# Patient Record
Sex: MALE | Race: ASIAN | NOT HISPANIC OR LATINO | URBAN - METROPOLITAN AREA
[De-identification: names, ages, dates, MRNs, and addresses within clinical notes are randomized per-mention and may not be internally consistent; named-entity substitution may affect disease eponyms.]

---

## 2018-03-07 ENCOUNTER — INPATIENT (INPATIENT)
Facility: HOSPITAL | Age: 33
LOS: 28 days | Discharge: HOME | End: 2018-04-05
Attending: PSYCHIATRY & NEUROLOGY | Admitting: PSYCHIATRY & NEUROLOGY
Payer: MEDICAID

## 2018-03-07 VITALS
TEMPERATURE: 98 F | SYSTOLIC BLOOD PRESSURE: 137 MMHG | OXYGEN SATURATION: 98 % | HEART RATE: 84 BPM | RESPIRATION RATE: 17 BRPM | DIASTOLIC BLOOD PRESSURE: 89 MMHG

## 2018-03-07 DIAGNOSIS — F29 UNSPECIFIED PSYCHOSIS NOT DUE TO A SUBSTANCE OR KNOWN PHYSIOLOGICAL CONDITION: ICD-10-CM

## 2018-03-07 DIAGNOSIS — F20.0 PARANOID SCHIZOPHRENIA: ICD-10-CM

## 2018-03-07 LAB
ALBUMIN SERPL ELPH-MCNC: 4.3 G/DL — SIGNIFICANT CHANGE UP (ref 3.3–5)
ALP SERPL-CCNC: 59 U/L — SIGNIFICANT CHANGE UP (ref 40–120)
ALT FLD-CCNC: 16 U/L — SIGNIFICANT CHANGE UP (ref 4–41)
APAP SERPL-MCNC: < 15 UG/ML — LOW (ref 15–25)
AST SERPL-CCNC: 21 U/L — SIGNIFICANT CHANGE UP (ref 4–40)
BARBITURATES MEASUREMENT: NEGATIVE — SIGNIFICANT CHANGE UP
BASOPHILS # BLD AUTO: 0.01 K/UL — SIGNIFICANT CHANGE UP (ref 0–0.2)
BASOPHILS NFR BLD AUTO: 0.2 % — SIGNIFICANT CHANGE UP (ref 0–2)
BENZODIAZ SERPL-MCNC: NEGATIVE — SIGNIFICANT CHANGE UP
BILIRUB SERPL-MCNC: 0.4 MG/DL — SIGNIFICANT CHANGE UP (ref 0.2–1.2)
BUN SERPL-MCNC: 23 MG/DL — SIGNIFICANT CHANGE UP (ref 7–23)
CALCIUM SERPL-MCNC: 9 MG/DL — SIGNIFICANT CHANGE UP (ref 8.4–10.5)
CHLORIDE SERPL-SCNC: 106 MMOL/L — SIGNIFICANT CHANGE UP (ref 98–107)
CO2 SERPL-SCNC: 25 MMOL/L — SIGNIFICANT CHANGE UP (ref 22–31)
CREAT SERPL-MCNC: 0.79 MG/DL — SIGNIFICANT CHANGE UP (ref 0.5–1.3)
EOSINOPHIL # BLD AUTO: 0.02 K/UL — SIGNIFICANT CHANGE UP (ref 0–0.5)
EOSINOPHIL NFR BLD AUTO: 0.3 % — SIGNIFICANT CHANGE UP (ref 0–6)
ETHANOL BLD-MCNC: < 10 MG/DL — SIGNIFICANT CHANGE UP
GLUCOSE SERPL-MCNC: 106 MG/DL — HIGH (ref 70–99)
HCT VFR BLD CALC: 36.9 % — LOW (ref 39–50)
HGB BLD-MCNC: 11.9 G/DL — LOW (ref 13–17)
IMM GRANULOCYTES # BLD AUTO: 0.02 # — SIGNIFICANT CHANGE UP
IMM GRANULOCYTES NFR BLD AUTO: 0.3 % — SIGNIFICANT CHANGE UP (ref 0–1.5)
LYMPHOCYTES # BLD AUTO: 0.78 K/UL — LOW (ref 1–3.3)
LYMPHOCYTES # BLD AUTO: 12.1 % — LOW (ref 13–44)
MCHC RBC-ENTMCNC: 29.8 PG — SIGNIFICANT CHANGE UP (ref 27–34)
MCHC RBC-ENTMCNC: 32.2 % — SIGNIFICANT CHANGE UP (ref 32–36)
MCV RBC AUTO: 92.5 FL — SIGNIFICANT CHANGE UP (ref 80–100)
MONOCYTES # BLD AUTO: 0.52 K/UL — SIGNIFICANT CHANGE UP (ref 0–0.9)
MONOCYTES NFR BLD AUTO: 8.1 % — SIGNIFICANT CHANGE UP (ref 2–14)
NEUTROPHILS # BLD AUTO: 5.07 K/UL — SIGNIFICANT CHANGE UP (ref 1.8–7.4)
NEUTROPHILS NFR BLD AUTO: 79 % — HIGH (ref 43–77)
NRBC # FLD: 0 — SIGNIFICANT CHANGE UP
PLATELET # BLD AUTO: 202 K/UL — SIGNIFICANT CHANGE UP (ref 150–400)
PMV BLD: 10.6 FL — SIGNIFICANT CHANGE UP (ref 7–13)
POTASSIUM SERPL-MCNC: 4.1 MMOL/L — SIGNIFICANT CHANGE UP (ref 3.5–5.3)
POTASSIUM SERPL-SCNC: 4.1 MMOL/L — SIGNIFICANT CHANGE UP (ref 3.5–5.3)
PROT SERPL-MCNC: 6.3 G/DL — SIGNIFICANT CHANGE UP (ref 6–8.3)
RBC # BLD: 3.99 M/UL — LOW (ref 4.2–5.8)
RBC # FLD: 12.4 % — SIGNIFICANT CHANGE UP (ref 10.3–14.5)
SALICYLATES SERPL-MCNC: < 5 MG/DL — LOW (ref 15–30)
SODIUM SERPL-SCNC: 142 MMOL/L — SIGNIFICANT CHANGE UP (ref 135–145)
TSH SERPL-MCNC: 2.67 UIU/ML — SIGNIFICANT CHANGE UP (ref 0.27–4.2)
WBC # BLD: 6.42 K/UL — SIGNIFICANT CHANGE UP (ref 3.8–10.5)
WBC # FLD AUTO: 6.42 K/UL — SIGNIFICANT CHANGE UP (ref 3.8–10.5)

## 2018-03-07 PROCEDURE — 99285 EMERGENCY DEPT VISIT HI MDM: CPT

## 2018-03-07 RX ORDER — BENZTROPINE MESYLATE 1 MG
1 TABLET ORAL ONCE
Qty: 0 | Refills: 0 | Status: COMPLETED | OUTPATIENT
Start: 2018-03-07 | End: 2018-03-07

## 2018-03-07 RX ORDER — RISPERIDONE 4 MG/1
1 TABLET ORAL
Qty: 0 | Refills: 0 | Status: DISCONTINUED | OUTPATIENT
Start: 2018-03-07 | End: 2018-03-08

## 2018-03-07 RX ORDER — HALOPERIDOL DECANOATE 100 MG/ML
5 INJECTION INTRAMUSCULAR ONCE
Qty: 0 | Refills: 0 | Status: COMPLETED | OUTPATIENT
Start: 2018-03-07 | End: 2018-03-07

## 2018-03-07 RX ORDER — HALOPERIDOL DECANOATE 100 MG/ML
5 INJECTION INTRAMUSCULAR EVERY 6 HOURS
Qty: 0 | Refills: 0 | Status: DISCONTINUED | OUTPATIENT
Start: 2018-03-07 | End: 2018-04-05

## 2018-03-07 RX ORDER — BENZTROPINE MESYLATE 1 MG
1 TABLET ORAL
Qty: 0 | Refills: 0 | Status: DISCONTINUED | OUTPATIENT
Start: 2018-03-07 | End: 2018-03-09

## 2018-03-07 RX ORDER — DIPHENHYDRAMINE HCL 50 MG
50 CAPSULE ORAL EVERY 6 HOURS
Qty: 0 | Refills: 0 | Status: DISCONTINUED | OUTPATIENT
Start: 2018-03-07 | End: 2018-04-05

## 2018-03-07 RX ADMIN — Medication 1 MILLIGRAM(S): at 16:51

## 2018-03-07 RX ADMIN — HALOPERIDOL DECANOATE 5 MILLIGRAM(S): 100 INJECTION INTRAMUSCULAR at 02:45

## 2018-03-07 RX ADMIN — Medication 2 MILLIGRAM(S): at 02:45

## 2018-03-07 RX ADMIN — Medication 2 MILLIGRAM(S): at 22:02

## 2018-03-07 RX ADMIN — Medication 1 MILLIGRAM(S): at 21:24

## 2018-03-07 RX ADMIN — RISPERIDONE 1 MILLIGRAM(S): 4 TABLET ORAL at 21:24

## 2018-03-07 NOTE — ED BEHAVIORAL HEALTH NOTE - BEHAVIORAL HEALTH NOTE
Writer was informed patient required inpatient admission.  Writer was asked to contact [facility] for possible transfer.  Writer called Emmanuelle Pyle  and Francia Garcia  leaving a message for each requesting a callback to [employer] work [facility].

## 2018-03-07 NOTE — ED BEHAVIORAL HEALTH ASSESSMENT NOTE - DESCRIPTION
Vital Signs Last 24 Hrs  T(C): 36.7 (07 Mar 2018 09:29), Max: 36.7 (07 Mar 2018 04:27)  T(F): 98 (07 Mar 2018 09:29), Max: 98 (07 Mar 2018 04:27)  HR: 58 (07 Mar 2018 09:29) (58 - 84)  BP: 116/79 (07 Mar 2018 09:29) (116/79 - 137/89)  BP(mean): --  RR: 16 (07 Mar 2018 09:29) (16 - 17)  SpO2: 99% (07 Mar 2018 09:29) (98% - 99%) denies pt currently unemployed, resides with parents

## 2018-03-07 NOTE — ED BEHAVIORAL HEALTH ASSESSMENT NOTE - RISK ASSESSMENT
Pt has elevated risk of harm to others due to recent violence, aggression, non-compliance and CAH; though no known prior suicide attempt, pt has CAH which are unclear if they tell him to harm self.

## 2018-03-07 NOTE — ED PROVIDER NOTE - PHYSICAL EXAMINATION
uncooperative, agitated, attempting to walk out.   No clonus, rigidity, tremors, fasciculations. PERRL, EOMI, no nystagmus. Strength 5/5. Steady unassisted gait.
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
## 2018-03-07 NOTE — ED BEHAVIORAL HEALTH ASSESSMENT NOTE - OTHER PAST PSYCHIATRIC HISTORY (INCLUDE DETAILS REGARDING ONSET, COURSE OF ILLNESS, INPATIENT/OUTPATIENT TREATMENT)
Per Paulie, pt last prescribed meds in 2016, was on Remeron and low dose seroquel; pt had brief outpt treatment at [facility] in 2014, 2015. No recent hospitalizations noted, but pt admits to prior inpt stay at [facility]? or [facility]? he mentioned both
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
## 2018-03-07 NOTE — ED PROVIDER NOTE - PROGRESS NOTE DETAILS
Antionette: EMs obtained another number [phone]. initially I called and someone picked up but I don't think they understood me. Called w/ mandarin  [phone] but then no one picked up. Klepfish: Pt sleeping. Labs, ekg grossly wnl. Medically cleared for further BH fitz. Klepfish: labs/ekg wnl. Medically cleared, rediscussed w/ BH, awaiting  reassessment.

## 2018-03-07 NOTE — ED BEHAVIORAL HEALTH ASSESSMENT NOTE - DETAILS
headache reports they tell him to break things at times; hurt self/others broke items in home prior to arrival attempted to call numbers available, no sucess Dr. Thompson-  attempted to call numbers available, no success

## 2018-03-07 NOTE — ED BEHAVIORAL HEALTH NOTE - BEHAVIORAL HEALTH NOTE
Worker used [employer] interpre[employer] ([phone]) to speak with patient's parents mother Christi and father Jose G ([phone]). Worker informed patient's parents that patient was admitted to  and provided unit information. Worker also provided emotional support for parents about patient's admission.

## 2018-03-08 LAB
CHOLEST SERPL-MCNC: 201 MG/DL — HIGH (ref 120–199)
FERRITIN SERPL-MCNC: 207.8 NG/ML — SIGNIFICANT CHANGE UP (ref 30–400)
FOLATE SERPL-MCNC: 11.2 NG/ML — SIGNIFICANT CHANGE UP (ref 4.7–20)
HDLC SERPL-MCNC: 113 MG/DL — HIGH (ref 35–55)
IRON SATN MFR SERPL: 273 UG/DL — SIGNIFICANT CHANGE UP (ref 155–535)
IRON SATN MFR SERPL: 76 UG/DL — SIGNIFICANT CHANGE UP (ref 45–165)
LIPID PNL WITH DIRECT LDL SERPL: 105 MG/DL — SIGNIFICANT CHANGE UP
TRIGL SERPL-MCNC: 60 MG/DL — SIGNIFICANT CHANGE UP (ref 10–149)
UIBC SERPL-MCNC: 197 UG/DL — SIGNIFICANT CHANGE UP (ref 110–370)
VIT B12 SERPL-MCNC: 469 PG/ML — SIGNIFICANT CHANGE UP (ref 200–900)

## 2018-03-08 PROCEDURE — 99222 1ST HOSP IP/OBS MODERATE 55: CPT

## 2018-03-08 RX ORDER — RISPERIDONE 4 MG/1
1 TABLET ORAL DAILY
Qty: 0 | Refills: 0 | Status: DISCONTINUED | OUTPATIENT
Start: 2018-03-08 | End: 2018-03-22

## 2018-03-08 RX ORDER — RISPERIDONE 4 MG/1
2 TABLET ORAL AT BEDTIME
Qty: 0 | Refills: 0 | Status: DISCONTINUED | OUTPATIENT
Start: 2018-03-08 | End: 2018-03-09

## 2018-03-08 RX ADMIN — RISPERIDONE 2 MILLIGRAM(S): 4 TABLET ORAL at 21:13

## 2018-03-08 RX ADMIN — RISPERIDONE 1 MILLIGRAM(S): 4 TABLET ORAL at 08:54

## 2018-03-08 RX ADMIN — Medication 1 MILLIGRAM(S): at 08:54

## 2018-03-08 RX ADMIN — Medication 2 MILLIGRAM(S): at 21:30

## 2018-03-08 RX ADMIN — Medication 1 MILLIGRAM(S): at 21:13

## 2018-03-09 PROCEDURE — 99232 SBSQ HOSP IP/OBS MODERATE 35: CPT

## 2018-03-09 RX ORDER — RISPERIDONE 4 MG/1
3 TABLET ORAL AT BEDTIME
Qty: 0 | Refills: 0 | Status: DISCONTINUED | OUTPATIENT
Start: 2018-03-09 | End: 2018-03-13

## 2018-03-09 RX ORDER — BENZTROPINE MESYLATE 1 MG
0.5 TABLET ORAL
Qty: 0 | Refills: 0 | Status: DISCONTINUED | OUTPATIENT
Start: 2018-03-09 | End: 2018-03-22

## 2018-03-09 RX ORDER — CLONAZEPAM 1 MG
1 TABLET ORAL AT BEDTIME
Qty: 0 | Refills: 0 | Status: DISCONTINUED | OUTPATIENT
Start: 2018-03-09 | End: 2018-03-15

## 2018-03-09 RX ADMIN — Medication 1 MILLIGRAM(S): at 20:30

## 2018-03-09 RX ADMIN — RISPERIDONE 1 MILLIGRAM(S): 4 TABLET ORAL at 09:09

## 2018-03-09 RX ADMIN — Medication 0.5 MILLIGRAM(S): at 20:30

## 2018-03-09 RX ADMIN — RISPERIDONE 3 MILLIGRAM(S): 4 TABLET ORAL at 20:30

## 2018-03-09 RX ADMIN — Medication 1 MILLIGRAM(S): at 09:09

## 2018-03-10 PROCEDURE — 99231 SBSQ HOSP IP/OBS SF/LOW 25: CPT

## 2018-03-10 RX ADMIN — Medication 0.5 MILLIGRAM(S): at 21:11

## 2018-03-10 RX ADMIN — Medication 0.5 MILLIGRAM(S): at 08:53

## 2018-03-10 RX ADMIN — RISPERIDONE 3 MILLIGRAM(S): 4 TABLET ORAL at 21:11

## 2018-03-10 RX ADMIN — RISPERIDONE 1 MILLIGRAM(S): 4 TABLET ORAL at 08:53

## 2018-03-10 RX ADMIN — Medication 1 MILLIGRAM(S): at 21:11

## 2018-03-11 PROCEDURE — 99231 SBSQ HOSP IP/OBS SF/LOW 25: CPT

## 2018-03-11 RX ADMIN — Medication 1 MILLIGRAM(S): at 21:05

## 2018-03-11 RX ADMIN — Medication 0.5 MILLIGRAM(S): at 10:00

## 2018-03-11 RX ADMIN — RISPERIDONE 1 MILLIGRAM(S): 4 TABLET ORAL at 10:00

## 2018-03-11 RX ADMIN — Medication 0.5 MILLIGRAM(S): at 21:05

## 2018-03-11 RX ADMIN — RISPERIDONE 3 MILLIGRAM(S): 4 TABLET ORAL at 21:05

## 2018-03-12 PROCEDURE — 90853 GROUP PSYCHOTHERAPY: CPT

## 2018-03-12 PROCEDURE — 99232 SBSQ HOSP IP/OBS MODERATE 35: CPT | Mod: 25

## 2018-03-12 RX ORDER — SERTRALINE 25 MG/1
25 TABLET, FILM COATED ORAL DAILY
Qty: 0 | Refills: 0 | Status: DISCONTINUED | OUTPATIENT
Start: 2018-03-13 | End: 2018-03-19

## 2018-03-12 RX ADMIN — Medication 0.5 MILLIGRAM(S): at 09:35

## 2018-03-12 RX ADMIN — RISPERIDONE 1 MILLIGRAM(S): 4 TABLET ORAL at 09:35

## 2018-03-12 RX ADMIN — Medication 1 MILLIGRAM(S): at 21:01

## 2018-03-12 RX ADMIN — RISPERIDONE 3 MILLIGRAM(S): 4 TABLET ORAL at 21:01

## 2018-03-12 RX ADMIN — Medication 0.5 MILLIGRAM(S): at 21:01

## 2018-03-13 PROCEDURE — 90853 GROUP PSYCHOTHERAPY: CPT

## 2018-03-13 PROCEDURE — 99232 SBSQ HOSP IP/OBS MODERATE 35: CPT | Mod: 25

## 2018-03-13 RX ORDER — RISPERIDONE 4 MG/1
4 TABLET ORAL AT BEDTIME
Qty: 0 | Refills: 0 | Status: DISCONTINUED | OUTPATIENT
Start: 2018-03-13 | End: 2018-03-14

## 2018-03-13 RX ADMIN — RISPERIDONE 1 MILLIGRAM(S): 4 TABLET ORAL at 09:09

## 2018-03-13 RX ADMIN — RISPERIDONE 4 MILLIGRAM(S): 4 TABLET ORAL at 20:58

## 2018-03-13 RX ADMIN — SERTRALINE 25 MILLIGRAM(S): 25 TABLET, FILM COATED ORAL at 09:09

## 2018-03-13 RX ADMIN — Medication 0.5 MILLIGRAM(S): at 20:58

## 2018-03-13 RX ADMIN — Medication 1 MILLIGRAM(S): at 20:58

## 2018-03-13 RX ADMIN — Medication 0.5 MILLIGRAM(S): at 09:09

## 2018-03-14 PROCEDURE — 99232 SBSQ HOSP IP/OBS MODERATE 35: CPT | Mod: 25

## 2018-03-14 PROCEDURE — 90853 GROUP PSYCHOTHERAPY: CPT

## 2018-03-14 RX ORDER — RISPERIDONE 4 MG/1
5 TABLET ORAL AT BEDTIME
Qty: 0 | Refills: 0 | Status: DISCONTINUED | OUTPATIENT
Start: 2018-03-14 | End: 2018-03-22

## 2018-03-14 RX ADMIN — RISPERIDONE 5 MILLIGRAM(S): 4 TABLET ORAL at 21:05

## 2018-03-14 RX ADMIN — Medication 0.5 MILLIGRAM(S): at 09:22

## 2018-03-14 RX ADMIN — Medication 1 MILLIGRAM(S): at 21:05

## 2018-03-14 RX ADMIN — RISPERIDONE 1 MILLIGRAM(S): 4 TABLET ORAL at 09:22

## 2018-03-14 RX ADMIN — Medication 0.5 MILLIGRAM(S): at 21:05

## 2018-03-14 RX ADMIN — SERTRALINE 25 MILLIGRAM(S): 25 TABLET, FILM COATED ORAL at 09:22

## 2018-03-15 PROCEDURE — 90853 GROUP PSYCHOTHERAPY: CPT

## 2018-03-15 PROCEDURE — 99232 SBSQ HOSP IP/OBS MODERATE 35: CPT | Mod: 25

## 2018-03-15 RX ORDER — CLONAZEPAM 1 MG
0.5 TABLET ORAL AT BEDTIME
Qty: 0 | Refills: 0 | Status: DISCONTINUED | OUTPATIENT
Start: 2018-03-15 | End: 2018-03-22

## 2018-03-15 RX ADMIN — RISPERIDONE 5 MILLIGRAM(S): 4 TABLET ORAL at 20:58

## 2018-03-15 RX ADMIN — SERTRALINE 25 MILLIGRAM(S): 25 TABLET, FILM COATED ORAL at 09:06

## 2018-03-15 RX ADMIN — Medication 0.5 MILLIGRAM(S): at 09:06

## 2018-03-15 RX ADMIN — Medication 0.5 MILLIGRAM(S): at 20:58

## 2018-03-15 RX ADMIN — RISPERIDONE 1 MILLIGRAM(S): 4 TABLET ORAL at 09:06

## 2018-03-16 PROCEDURE — 99232 SBSQ HOSP IP/OBS MODERATE 35: CPT | Mod: 25

## 2018-03-16 PROCEDURE — 90853 GROUP PSYCHOTHERAPY: CPT

## 2018-03-16 RX ADMIN — Medication 0.5 MILLIGRAM(S): at 20:37

## 2018-03-16 RX ADMIN — RISPERIDONE 5 MILLIGRAM(S): 4 TABLET ORAL at 20:37

## 2018-03-16 RX ADMIN — RISPERIDONE 1 MILLIGRAM(S): 4 TABLET ORAL at 09:10

## 2018-03-16 RX ADMIN — SERTRALINE 25 MILLIGRAM(S): 25 TABLET, FILM COATED ORAL at 09:10

## 2018-03-16 RX ADMIN — Medication 0.5 MILLIGRAM(S): at 09:10

## 2018-03-17 RX ADMIN — RISPERIDONE 1 MILLIGRAM(S): 4 TABLET ORAL at 08:36

## 2018-03-17 RX ADMIN — SERTRALINE 25 MILLIGRAM(S): 25 TABLET, FILM COATED ORAL at 08:36

## 2018-03-17 RX ADMIN — Medication 0.5 MILLIGRAM(S): at 08:36

## 2018-03-17 RX ADMIN — Medication 0.5 MILLIGRAM(S): at 20:49

## 2018-03-17 RX ADMIN — RISPERIDONE 5 MILLIGRAM(S): 4 TABLET ORAL at 20:49

## 2018-03-17 RX ADMIN — Medication 0.5 MILLIGRAM(S): at 20:34

## 2018-03-18 RX ADMIN — Medication 0.5 MILLIGRAM(S): at 20:29

## 2018-03-18 RX ADMIN — Medication 0.5 MILLIGRAM(S): at 09:35

## 2018-03-18 RX ADMIN — RISPERIDONE 1 MILLIGRAM(S): 4 TABLET ORAL at 09:35

## 2018-03-18 RX ADMIN — RISPERIDONE 5 MILLIGRAM(S): 4 TABLET ORAL at 20:29

## 2018-03-18 RX ADMIN — SERTRALINE 25 MILLIGRAM(S): 25 TABLET, FILM COATED ORAL at 09:35

## 2018-03-19 PROCEDURE — 90853 GROUP PSYCHOTHERAPY: CPT

## 2018-03-19 PROCEDURE — 99232 SBSQ HOSP IP/OBS MODERATE 35: CPT | Mod: 25

## 2018-03-19 RX ORDER — SERTRALINE 25 MG/1
50 TABLET, FILM COATED ORAL DAILY
Qty: 0 | Refills: 0 | Status: DISCONTINUED | OUTPATIENT
Start: 2018-03-20 | End: 2018-03-26

## 2018-03-19 RX ADMIN — Medication 0.5 MILLIGRAM(S): at 20:54

## 2018-03-19 RX ADMIN — SERTRALINE 25 MILLIGRAM(S): 25 TABLET, FILM COATED ORAL at 09:26

## 2018-03-19 RX ADMIN — RISPERIDONE 5 MILLIGRAM(S): 4 TABLET ORAL at 20:54

## 2018-03-19 RX ADMIN — RISPERIDONE 1 MILLIGRAM(S): 4 TABLET ORAL at 09:26

## 2018-03-19 RX ADMIN — Medication 0.5 MILLIGRAM(S): at 09:26

## 2018-03-20 PROCEDURE — 90853 GROUP PSYCHOTHERAPY: CPT

## 2018-03-20 PROCEDURE — 99232 SBSQ HOSP IP/OBS MODERATE 35: CPT | Mod: 25

## 2018-03-20 RX ADMIN — RISPERIDONE 5 MILLIGRAM(S): 4 TABLET ORAL at 21:10

## 2018-03-20 RX ADMIN — Medication 0.5 MILLIGRAM(S): at 21:10

## 2018-03-20 RX ADMIN — Medication 0.5 MILLIGRAM(S): at 08:50

## 2018-03-20 RX ADMIN — SERTRALINE 50 MILLIGRAM(S): 25 TABLET, FILM COATED ORAL at 08:50

## 2018-03-20 RX ADMIN — RISPERIDONE 1 MILLIGRAM(S): 4 TABLET ORAL at 08:50

## 2018-03-21 PROCEDURE — 90853 GROUP PSYCHOTHERAPY: CPT

## 2018-03-21 PROCEDURE — 99232 SBSQ HOSP IP/OBS MODERATE 35: CPT | Mod: 25

## 2018-03-21 RX ADMIN — RISPERIDONE 5 MILLIGRAM(S): 4 TABLET ORAL at 21:41

## 2018-03-21 RX ADMIN — Medication 0.5 MILLIGRAM(S): at 21:41

## 2018-03-21 RX ADMIN — Medication 0.5 MILLIGRAM(S): at 08:55

## 2018-03-21 RX ADMIN — RISPERIDONE 1 MILLIGRAM(S): 4 TABLET ORAL at 08:55

## 2018-03-21 RX ADMIN — SERTRALINE 50 MILLIGRAM(S): 25 TABLET, FILM COATED ORAL at 08:55

## 2018-03-22 PROCEDURE — 99232 SBSQ HOSP IP/OBS MODERATE 35: CPT

## 2018-03-22 RX ORDER — CLONAZEPAM 1 MG
0.5 TABLET ORAL AT BEDTIME
Qty: 0 | Refills: 0 | Status: DISCONTINUED | OUTPATIENT
Start: 2018-03-22 | End: 2018-03-27

## 2018-03-22 RX ORDER — RISPERIDONE 4 MG/1
6 TABLET ORAL AT BEDTIME
Qty: 0 | Refills: 0 | Status: DISCONTINUED | OUTPATIENT
Start: 2018-03-22 | End: 2018-04-05

## 2018-03-22 RX ADMIN — RISPERIDONE 6 MILLIGRAM(S): 4 TABLET ORAL at 20:49

## 2018-03-22 RX ADMIN — Medication 0.5 MILLIGRAM(S): at 20:49

## 2018-03-22 RX ADMIN — Medication 0.5 MILLIGRAM(S): at 08:56

## 2018-03-22 RX ADMIN — SERTRALINE 50 MILLIGRAM(S): 25 TABLET, FILM COATED ORAL at 08:56

## 2018-03-22 RX ADMIN — RISPERIDONE 1 MILLIGRAM(S): 4 TABLET ORAL at 08:56

## 2018-03-23 PROCEDURE — 90853 GROUP PSYCHOTHERAPY: CPT

## 2018-03-23 PROCEDURE — 99232 SBSQ HOSP IP/OBS MODERATE 35: CPT | Mod: 25

## 2018-03-23 RX ADMIN — RISPERIDONE 6 MILLIGRAM(S): 4 TABLET ORAL at 20:55

## 2018-03-23 RX ADMIN — SERTRALINE 50 MILLIGRAM(S): 25 TABLET, FILM COATED ORAL at 08:45

## 2018-03-23 RX ADMIN — Medication 0.5 MILLIGRAM(S): at 20:55

## 2018-03-24 RX ADMIN — Medication 0.5 MILLIGRAM(S): at 20:47

## 2018-03-24 RX ADMIN — SERTRALINE 50 MILLIGRAM(S): 25 TABLET, FILM COATED ORAL at 09:11

## 2018-03-24 RX ADMIN — RISPERIDONE 6 MILLIGRAM(S): 4 TABLET ORAL at 20:47

## 2018-03-25 RX ADMIN — SERTRALINE 50 MILLIGRAM(S): 25 TABLET, FILM COATED ORAL at 09:20

## 2018-03-25 RX ADMIN — Medication 0.5 MILLIGRAM(S): at 21:09

## 2018-03-25 RX ADMIN — RISPERIDONE 6 MILLIGRAM(S): 4 TABLET ORAL at 21:09

## 2018-03-26 PROCEDURE — 90853 GROUP PSYCHOTHERAPY: CPT

## 2018-03-26 PROCEDURE — 99232 SBSQ HOSP IP/OBS MODERATE 35: CPT | Mod: 25

## 2018-03-26 RX ORDER — SERTRALINE 25 MG/1
50 TABLET, FILM COATED ORAL AT BEDTIME
Qty: 0 | Refills: 0 | Status: DISCONTINUED | OUTPATIENT
Start: 2018-03-27 | End: 2018-04-05

## 2018-03-26 RX ADMIN — RISPERIDONE 6 MILLIGRAM(S): 4 TABLET ORAL at 21:06

## 2018-03-26 RX ADMIN — SERTRALINE 50 MILLIGRAM(S): 25 TABLET, FILM COATED ORAL at 08:31

## 2018-03-26 RX ADMIN — Medication 0.5 MILLIGRAM(S): at 21:06

## 2018-03-27 PROCEDURE — 99232 SBSQ HOSP IP/OBS MODERATE 35: CPT | Mod: 25

## 2018-03-27 PROCEDURE — 90853 GROUP PSYCHOTHERAPY: CPT

## 2018-03-27 RX ORDER — CLONAZEPAM 1 MG
0.25 TABLET ORAL AT BEDTIME
Qty: 0 | Refills: 0 | Status: DISCONTINUED | OUTPATIENT
Start: 2018-03-27 | End: 2018-03-28

## 2018-03-27 RX ADMIN — RISPERIDONE 6 MILLIGRAM(S): 4 TABLET ORAL at 20:50

## 2018-03-27 RX ADMIN — Medication 0.25 MILLIGRAM(S): at 20:50

## 2018-03-27 RX ADMIN — SERTRALINE 50 MILLIGRAM(S): 25 TABLET, FILM COATED ORAL at 20:50

## 2018-03-28 PROCEDURE — 90853 GROUP PSYCHOTHERAPY: CPT

## 2018-03-28 PROCEDURE — 99232 SBSQ HOSP IP/OBS MODERATE 35: CPT | Mod: 25

## 2018-03-28 RX ORDER — CLONAZEPAM 1 MG
0.25 TABLET ORAL AT BEDTIME
Qty: 0 | Refills: 0 | Status: DISCONTINUED | OUTPATIENT
Start: 2018-03-28 | End: 2018-04-03

## 2018-03-28 RX ADMIN — SERTRALINE 50 MILLIGRAM(S): 25 TABLET, FILM COATED ORAL at 20:51

## 2018-03-28 RX ADMIN — RISPERIDONE 6 MILLIGRAM(S): 4 TABLET ORAL at 21:33

## 2018-03-29 PROCEDURE — 90853 GROUP PSYCHOTHERAPY: CPT

## 2018-03-29 PROCEDURE — 99232 SBSQ HOSP IP/OBS MODERATE 35: CPT | Mod: 25

## 2018-03-29 RX ORDER — TRIHEXYPHENIDYL HCL 2 MG
2 TABLET ORAL DAILY
Qty: 0 | Refills: 0 | Status: DISCONTINUED | OUTPATIENT
Start: 2018-03-29 | End: 2018-04-05

## 2018-03-29 RX ADMIN — RISPERIDONE 6 MILLIGRAM(S): 4 TABLET ORAL at 20:23

## 2018-03-29 RX ADMIN — SERTRALINE 50 MILLIGRAM(S): 25 TABLET, FILM COATED ORAL at 20:23

## 2018-03-29 RX ADMIN — Medication 0.5 MILLIGRAM(S): at 20:23

## 2018-03-29 RX ADMIN — Medication 2 MILLIGRAM(S): at 14:07

## 2018-03-30 PROCEDURE — 99232 SBSQ HOSP IP/OBS MODERATE 35: CPT | Mod: 25

## 2018-03-30 PROCEDURE — 90853 GROUP PSYCHOTHERAPY: CPT

## 2018-03-30 RX ADMIN — RISPERIDONE 6 MILLIGRAM(S): 4 TABLET ORAL at 20:45

## 2018-03-30 RX ADMIN — Medication 0.5 MILLIGRAM(S): at 20:45

## 2018-03-30 RX ADMIN — Medication 0.5 MILLIGRAM(S): at 08:31

## 2018-03-30 RX ADMIN — Medication 2 MILLIGRAM(S): at 08:31

## 2018-03-30 RX ADMIN — SERTRALINE 50 MILLIGRAM(S): 25 TABLET, FILM COATED ORAL at 20:45

## 2018-03-31 RX ADMIN — SERTRALINE 50 MILLIGRAM(S): 25 TABLET, FILM COATED ORAL at 21:38

## 2018-03-31 RX ADMIN — Medication 2 MILLIGRAM(S): at 10:09

## 2018-03-31 RX ADMIN — Medication 0.5 MILLIGRAM(S): at 21:38

## 2018-03-31 RX ADMIN — Medication 0.5 MILLIGRAM(S): at 10:09

## 2018-03-31 RX ADMIN — RISPERIDONE 6 MILLIGRAM(S): 4 TABLET ORAL at 21:38

## 2018-04-01 RX ADMIN — SERTRALINE 50 MILLIGRAM(S): 25 TABLET, FILM COATED ORAL at 20:38

## 2018-04-01 RX ADMIN — RISPERIDONE 6 MILLIGRAM(S): 4 TABLET ORAL at 20:38

## 2018-04-01 RX ADMIN — Medication 0.5 MILLIGRAM(S): at 20:38

## 2018-04-01 RX ADMIN — Medication 0.5 MILLIGRAM(S): at 09:44

## 2018-04-01 RX ADMIN — Medication 2 MILLIGRAM(S): at 09:44

## 2018-04-02 PROCEDURE — 99232 SBSQ HOSP IP/OBS MODERATE 35: CPT | Mod: 25

## 2018-04-02 PROCEDURE — 90853 GROUP PSYCHOTHERAPY: CPT

## 2018-04-02 RX ADMIN — Medication 0.5 MILLIGRAM(S): at 08:26

## 2018-04-02 RX ADMIN — Medication 0.5 MILLIGRAM(S): at 20:09

## 2018-04-02 RX ADMIN — RISPERIDONE 6 MILLIGRAM(S): 4 TABLET ORAL at 20:09

## 2018-04-02 RX ADMIN — Medication 2 MILLIGRAM(S): at 08:26

## 2018-04-02 RX ADMIN — SERTRALINE 50 MILLIGRAM(S): 25 TABLET, FILM COATED ORAL at 20:09

## 2018-04-03 PROCEDURE — 99232 SBSQ HOSP IP/OBS MODERATE 35: CPT | Mod: 25

## 2018-04-03 PROCEDURE — 90853 GROUP PSYCHOTHERAPY: CPT

## 2018-04-03 RX ORDER — CLONAZEPAM 1 MG
0.25 TABLET ORAL AT BEDTIME
Qty: 0 | Refills: 0 | Status: DISCONTINUED | OUTPATIENT
Start: 2018-04-03 | End: 2018-04-05

## 2018-04-03 RX ADMIN — RISPERIDONE 6 MILLIGRAM(S): 4 TABLET ORAL at 20:44

## 2018-04-03 RX ADMIN — Medication 0.5 MILLIGRAM(S): at 09:39

## 2018-04-03 RX ADMIN — Medication 0.5 MILLIGRAM(S): at 20:44

## 2018-04-03 RX ADMIN — Medication 2 MILLIGRAM(S): at 09:39

## 2018-04-03 RX ADMIN — SERTRALINE 50 MILLIGRAM(S): 25 TABLET, FILM COATED ORAL at 20:44

## 2018-04-04 PROCEDURE — 99232 SBSQ HOSP IP/OBS MODERATE 35: CPT | Mod: 25

## 2018-04-04 PROCEDURE — 90853 GROUP PSYCHOTHERAPY: CPT

## 2018-04-04 RX ORDER — RISPERIDONE 4 MG/1
2 TABLET ORAL
Qty: 60 | Refills: 0 | OUTPATIENT
Start: 2018-04-04 | End: 2018-05-03

## 2018-04-04 RX ORDER — SERTRALINE 25 MG/1
1 TABLET, FILM COATED ORAL
Qty: 30 | Refills: 0 | OUTPATIENT
Start: 2018-04-04 | End: 2018-05-03

## 2018-04-04 RX ORDER — TRIHEXYPHENIDYL HCL 2 MG
1 TABLET ORAL
Qty: 30 | Refills: 0 | OUTPATIENT
Start: 2018-04-04 | End: 2018-05-03

## 2018-04-04 RX ADMIN — Medication 0.5 MILLIGRAM(S): at 20:34

## 2018-04-04 RX ADMIN — Medication 0.5 MILLIGRAM(S): at 09:15

## 2018-04-04 RX ADMIN — SERTRALINE 50 MILLIGRAM(S): 25 TABLET, FILM COATED ORAL at 20:34

## 2018-04-04 RX ADMIN — Medication 2 MILLIGRAM(S): at 09:15

## 2018-04-04 RX ADMIN — RISPERIDONE 6 MILLIGRAM(S): 4 TABLET ORAL at 20:34

## 2018-04-05 VITALS — DIASTOLIC BLOOD PRESSURE: 86 MMHG | SYSTOLIC BLOOD PRESSURE: 116 MMHG | TEMPERATURE: 97 F

## 2018-04-05 PROCEDURE — 99239 HOSP IP/OBS DSCHRG MGMT >30: CPT

## 2018-04-05 RX ADMIN — Medication 2 MILLIGRAM(S): at 08:34

## 2018-04-05 RX ADMIN — Medication 0.5 MILLIGRAM(S): at 08:33

## 2018-04-11 ENCOUNTER — OUTPATIENT (OUTPATIENT)
Dept: OUTPATIENT SERVICES | Facility: HOSPITAL | Age: 33
LOS: 1 days | Discharge: STILL ADMITTED OR ADMINISTRATIVE | End: 2018-04-11

## 2018-04-12 DIAGNOSIS — F20.0 PARANOID SCHIZOPHRENIA: ICD-10-CM

## 2018-06-06 ENCOUNTER — OUTPATIENT (OUTPATIENT)
Dept: OUTPATIENT SERVICES | Facility: HOSPITAL | Age: 33
LOS: 1 days | Discharge: HOME | End: 2018-06-06

## 2018-06-07 DIAGNOSIS — F20.0 PARANOID SCHIZOPHRENIA: ICD-10-CM

## 2018-07-01 ENCOUNTER — OUTPATIENT (OUTPATIENT)
Dept: OUTPATIENT SERVICES | Facility: HOSPITAL | Age: 33
LOS: 1 days | End: 2018-07-01
Payer: MEDICAID

## 2018-07-01 PROCEDURE — G9001: CPT

## 2018-07-31 DIAGNOSIS — Z71.89 OTHER SPECIFIED COUNSELING: ICD-10-CM

## 2018-10-05 ENCOUNTER — OUTPATIENT (OUTPATIENT)
Dept: OUTPATIENT SERVICES | Facility: HOSPITAL | Age: 33
LOS: 1 days | Discharge: HOME | End: 2018-10-05

## 2018-10-05 DIAGNOSIS — F20.0 PARANOID SCHIZOPHRENIA: ICD-10-CM

## 2018-11-29 LAB
ALBUMIN SERPL ELPH-MCNC: 4.7 G/DL — SIGNIFICANT CHANGE UP (ref 3.3–5)
ALP SERPL-CCNC: 67 U/L — SIGNIFICANT CHANGE UP (ref 40–120)
ALT FLD-CCNC: 14 U/L — SIGNIFICANT CHANGE UP (ref 4–41)
AST SERPL-CCNC: 19 U/L — SIGNIFICANT CHANGE UP (ref 4–40)
BASOPHILS # BLD AUTO: 0.01 K/UL — SIGNIFICANT CHANGE UP (ref 0–0.2)
BASOPHILS NFR BLD AUTO: 0.4 % — SIGNIFICANT CHANGE UP (ref 0–2)
BILIRUB SERPL-MCNC: 0.6 MG/DL — SIGNIFICANT CHANGE UP (ref 0.2–1.2)
BUN SERPL-MCNC: 19 MG/DL — SIGNIFICANT CHANGE UP (ref 7–23)
CALCIUM SERPL-MCNC: 9.7 MG/DL — SIGNIFICANT CHANGE UP (ref 8.4–10.5)
CHLORIDE SERPL-SCNC: 100 MMOL/L — SIGNIFICANT CHANGE UP (ref 98–107)
CHOLEST SERPL-MCNC: 182 MG/DL — SIGNIFICANT CHANGE UP (ref 120–199)
CO2 SERPL-SCNC: 27 MMOL/L — SIGNIFICANT CHANGE UP (ref 22–31)
CREAT SERPL-MCNC: 1.07 MG/DL — SIGNIFICANT CHANGE UP (ref 0.5–1.3)
EOSINOPHIL # BLD AUTO: 0.05 K/UL — SIGNIFICANT CHANGE UP (ref 0–0.5)
EOSINOPHIL NFR BLD AUTO: 1.8 % — SIGNIFICANT CHANGE UP (ref 0–6)
GLUCOSE SERPL-MCNC: 74 MG/DL — SIGNIFICANT CHANGE UP (ref 70–99)
HBA1C BLD-MCNC: 5.5 % — SIGNIFICANT CHANGE UP (ref 4–5.6)
HCT VFR BLD CALC: 40.9 % — SIGNIFICANT CHANGE UP (ref 39–50)
HDLC SERPL-MCNC: 81 MG/DL — HIGH (ref 35–55)
HGB BLD-MCNC: 13.1 G/DL — SIGNIFICANT CHANGE UP (ref 13–17)
IMM GRANULOCYTES # BLD AUTO: 0 # — SIGNIFICANT CHANGE UP
IMM GRANULOCYTES NFR BLD AUTO: 0 % — SIGNIFICANT CHANGE UP (ref 0–1.5)
LIPID PNL WITH DIRECT LDL SERPL: 99 MG/DL — SIGNIFICANT CHANGE UP
LYMPHOCYTES # BLD AUTO: 1.14 K/UL — SIGNIFICANT CHANGE UP (ref 1–3.3)
LYMPHOCYTES # BLD AUTO: 40.1 % — SIGNIFICANT CHANGE UP (ref 13–44)
MCHC RBC-ENTMCNC: 30 PG — SIGNIFICANT CHANGE UP (ref 27–34)
MCHC RBC-ENTMCNC: 32 % — SIGNIFICANT CHANGE UP (ref 32–36)
MCV RBC AUTO: 93.8 FL — SIGNIFICANT CHANGE UP (ref 80–100)
MONOCYTES # BLD AUTO: 0.29 K/UL — SIGNIFICANT CHANGE UP (ref 0–0.9)
MONOCYTES NFR BLD AUTO: 10.2 % — SIGNIFICANT CHANGE UP (ref 2–14)
NEUTROPHILS # BLD AUTO: 1.35 K/UL — LOW (ref 1.8–7.4)
NEUTROPHILS NFR BLD AUTO: 47.5 % — SIGNIFICANT CHANGE UP (ref 43–77)
NRBC # FLD: 0 — SIGNIFICANT CHANGE UP
PLATELET # BLD AUTO: 214 K/UL — SIGNIFICANT CHANGE UP (ref 150–400)
PMV BLD: 10.7 FL — SIGNIFICANT CHANGE UP (ref 7–13)
POTASSIUM SERPL-MCNC: 4.2 MMOL/L — SIGNIFICANT CHANGE UP (ref 3.5–5.3)
POTASSIUM SERPL-SCNC: 4.2 MMOL/L — SIGNIFICANT CHANGE UP (ref 3.5–5.3)
PROLACTIN SERPL-MCNC: 99.7 NG/ML — HIGH (ref 4.1–18.4)
PROT SERPL-MCNC: 7.1 G/DL — SIGNIFICANT CHANGE UP (ref 6–8.3)
RBC # BLD: 4.36 M/UL — SIGNIFICANT CHANGE UP (ref 4.2–5.8)
RBC # FLD: 12.6 % — SIGNIFICANT CHANGE UP (ref 10.3–14.5)
SODIUM SERPL-SCNC: 140 MMOL/L — SIGNIFICANT CHANGE UP (ref 135–145)
TRIGL SERPL-MCNC: 43 MG/DL — SIGNIFICANT CHANGE UP (ref 10–149)
TSH SERPL-MCNC: 2.16 UIU/ML — SIGNIFICANT CHANGE UP (ref 0.27–4.2)
WBC # BLD: 2.84 K/UL — LOW (ref 3.8–10.5)
WBC # FLD AUTO: 2.84 K/UL — LOW (ref 3.8–10.5)

## 2018-12-05 LAB
BASOPHILS # BLD AUTO: 0.01 K/UL — SIGNIFICANT CHANGE UP (ref 0–0.2)
BASOPHILS NFR BLD AUTO: 0.3 % — SIGNIFICANT CHANGE UP (ref 0–2)
EOSINOPHIL # BLD AUTO: 0.05 K/UL — SIGNIFICANT CHANGE UP (ref 0–0.5)
EOSINOPHIL NFR BLD AUTO: 1.3 % — SIGNIFICANT CHANGE UP (ref 0–6)
HCT VFR BLD CALC: 39.6 % — SIGNIFICANT CHANGE UP (ref 39–50)
HGB BLD-MCNC: 12.6 G/DL — LOW (ref 13–17)
IMM GRANULOCYTES # BLD AUTO: 0.01 # — SIGNIFICANT CHANGE UP
IMM GRANULOCYTES NFR BLD AUTO: 0.3 % — SIGNIFICANT CHANGE UP (ref 0–1.5)
LYMPHOCYTES # BLD AUTO: 1.47 K/UL — SIGNIFICANT CHANGE UP (ref 1–3.3)
LYMPHOCYTES # BLD AUTO: 38.1 % — SIGNIFICANT CHANGE UP (ref 13–44)
MCHC RBC-ENTMCNC: 29 PG — SIGNIFICANT CHANGE UP (ref 27–34)
MCHC RBC-ENTMCNC: 31.8 % — LOW (ref 32–36)
MCV RBC AUTO: 91 FL — SIGNIFICANT CHANGE UP (ref 80–100)
MONOCYTES # BLD AUTO: 0.42 K/UL — SIGNIFICANT CHANGE UP (ref 0–0.9)
MONOCYTES NFR BLD AUTO: 10.9 % — SIGNIFICANT CHANGE UP (ref 2–14)
NEUTROPHILS # BLD AUTO: 1.9 K/UL — SIGNIFICANT CHANGE UP (ref 1.8–7.4)
NEUTROPHILS NFR BLD AUTO: 49.1 % — SIGNIFICANT CHANGE UP (ref 43–77)
NRBC # FLD: 0 — SIGNIFICANT CHANGE UP
PLATELET # BLD AUTO: 212 K/UL — SIGNIFICANT CHANGE UP (ref 150–400)
PMV BLD: 10.9 FL — SIGNIFICANT CHANGE UP (ref 7–13)
RBC # BLD: 4.35 M/UL — SIGNIFICANT CHANGE UP (ref 4.2–5.8)
RBC # FLD: 12.6 % — SIGNIFICANT CHANGE UP (ref 10.3–14.5)
WBC # BLD: 3.86 K/UL — SIGNIFICANT CHANGE UP (ref 3.8–10.5)
WBC # FLD AUTO: 3.86 K/UL — SIGNIFICANT CHANGE UP (ref 3.8–10.5)

## 2018-12-09 LAB — RISPERIDONE+9OH-RIS SERPL-MCNC: SIGNIFICANT CHANGE UP

## 2019-05-28 NOTE — ED ADULT NURSE REASSESSMENT NOTE - NS ED NURSE REASSESS COMMENT FT1
Pt remained disorganized and uncooperative while handcuffed by [facility] [facility]. Assessed by Antionette FLYNN, pt ordered to receive STAT Haldol 5/Ativan 2mg IM. Pt talking to self and repeatedly stating "I don't need medicine, I don't take medicine". Chnaged into hospital gowns, awaiting medical and psych eval. Labs/ekg pending.
+ effect from IM meds received, pt sedated, VSS. Lab results pending, EKG ordered. Pending MC and psych evaluation.
PROCEDURES:  Medial meniscectomy 28-May-2019 11:28:35  Morenita Champion

## 2019-07-11 LAB
ALBUMIN SERPL ELPH-MCNC: 4.8 G/DL — SIGNIFICANT CHANGE UP (ref 3.3–5)
ALP SERPL-CCNC: 81 U/L — SIGNIFICANT CHANGE UP (ref 40–120)
ALT FLD-CCNC: 17 U/L — SIGNIFICANT CHANGE UP (ref 4–41)
ANION GAP SERPL CALC-SCNC: 12 MMO/L — SIGNIFICANT CHANGE UP (ref 7–14)
AST SERPL-CCNC: 17 U/L — SIGNIFICANT CHANGE UP (ref 4–40)
BASOPHILS # BLD AUTO: 0.01 K/UL — SIGNIFICANT CHANGE UP (ref 0–0.2)
BASOPHILS NFR BLD AUTO: 0.4 % — SIGNIFICANT CHANGE UP (ref 0–2)
BILIRUB SERPL-MCNC: 0.7 MG/DL — SIGNIFICANT CHANGE UP (ref 0.2–1.2)
BUN SERPL-MCNC: 15 MG/DL — SIGNIFICANT CHANGE UP (ref 7–23)
CALCIUM SERPL-MCNC: 9.9 MG/DL — SIGNIFICANT CHANGE UP (ref 8.4–10.5)
CHLORIDE SERPL-SCNC: 100 MMOL/L — SIGNIFICANT CHANGE UP (ref 98–107)
CHOLEST SERPL-MCNC: 161 MG/DL — SIGNIFICANT CHANGE UP (ref 120–199)
CO2 SERPL-SCNC: 29 MMOL/L — SIGNIFICANT CHANGE UP (ref 22–31)
CREAT SERPL-MCNC: 1.09 MG/DL — SIGNIFICANT CHANGE UP (ref 0.5–1.3)
EOSINOPHIL # BLD AUTO: 0.07 K/UL — SIGNIFICANT CHANGE UP (ref 0–0.5)
EOSINOPHIL NFR BLD AUTO: 2.5 % — SIGNIFICANT CHANGE UP (ref 0–6)
GLUCOSE SERPL-MCNC: 84 MG/DL — SIGNIFICANT CHANGE UP (ref 70–99)
HBA1C BLD-MCNC: 5.4 % — SIGNIFICANT CHANGE UP (ref 4–5.6)
HCT VFR BLD CALC: 37.7 % — LOW (ref 39–50)
HDLC SERPL-MCNC: 78 MG/DL — HIGH (ref 35–55)
HGB BLD-MCNC: 12 G/DL — LOW (ref 13–17)
IMM GRANULOCYTES NFR BLD AUTO: 0 % — SIGNIFICANT CHANGE UP (ref 0–1.5)
LIPID PNL WITH DIRECT LDL SERPL: 90 MG/DL — SIGNIFICANT CHANGE UP
LYMPHOCYTES # BLD AUTO: 0.99 K/UL — LOW (ref 1–3.3)
LYMPHOCYTES # BLD AUTO: 35.1 % — SIGNIFICANT CHANGE UP (ref 13–44)
MCHC RBC-ENTMCNC: 29.3 PG — SIGNIFICANT CHANGE UP (ref 27–34)
MCHC RBC-ENTMCNC: 31.8 % — LOW (ref 32–36)
MCV RBC AUTO: 92 FL — SIGNIFICANT CHANGE UP (ref 80–100)
MONOCYTES # BLD AUTO: 0.27 K/UL — SIGNIFICANT CHANGE UP (ref 0–0.9)
MONOCYTES NFR BLD AUTO: 9.6 % — SIGNIFICANT CHANGE UP (ref 2–14)
NEUTROPHILS # BLD AUTO: 1.48 K/UL — LOW (ref 1.8–7.4)
NEUTROPHILS NFR BLD AUTO: 52.4 % — SIGNIFICANT CHANGE UP (ref 43–77)
NRBC # FLD: 0 K/UL — SIGNIFICANT CHANGE UP (ref 0–0)
PLATELET # BLD AUTO: 198 K/UL — SIGNIFICANT CHANGE UP (ref 150–400)
PMV BLD: 11.4 FL — SIGNIFICANT CHANGE UP (ref 7–13)
POTASSIUM SERPL-MCNC: 3.9 MMOL/L — SIGNIFICANT CHANGE UP (ref 3.5–5.3)
POTASSIUM SERPL-SCNC: 3.9 MMOL/L — SIGNIFICANT CHANGE UP (ref 3.5–5.3)
PROLACTIN SERPL-MCNC: 80.5 NG/ML — HIGH (ref 4.1–18.4)
PROT SERPL-MCNC: 6.8 G/DL — SIGNIFICANT CHANGE UP (ref 6–8.3)
RBC # BLD: 4.1 M/UL — LOW (ref 4.2–5.8)
RBC # FLD: 13.1 % — SIGNIFICANT CHANGE UP (ref 10.3–14.5)
SODIUM SERPL-SCNC: 141 MMOL/L — SIGNIFICANT CHANGE UP (ref 135–145)
TRIGL SERPL-MCNC: 59 MG/DL — SIGNIFICANT CHANGE UP (ref 10–149)
TSH SERPL-MCNC: 1.71 UIU/ML — SIGNIFICANT CHANGE UP (ref 0.27–4.2)
WBC # BLD: 2.82 K/UL — LOW (ref 3.8–10.5)
WBC # FLD AUTO: 2.82 K/UL — LOW (ref 3.8–10.5)

## 2019-07-14 LAB — RISPERIDONE+9OH-RIS SERPL-MCNC: SIGNIFICANT CHANGE UP

## 2019-08-14 LAB
BASOPHILS # BLD AUTO: 0.02 K/UL — SIGNIFICANT CHANGE UP (ref 0–0.2)
BASOPHILS NFR BLD AUTO: 0.6 % — SIGNIFICANT CHANGE UP (ref 0–2)
BASOPHILS NFR SPEC: 0 % — SIGNIFICANT CHANGE UP (ref 0–2)
BLASTS # FLD: 0 % — SIGNIFICANT CHANGE UP (ref 0–0)
EOSINOPHIL # BLD AUTO: 0.05 K/UL — SIGNIFICANT CHANGE UP (ref 0–0.5)
EOSINOPHIL NFR BLD AUTO: 1.5 % — SIGNIFICANT CHANGE UP (ref 0–6)
EOSINOPHIL NFR FLD: 1.9 % — SIGNIFICANT CHANGE UP (ref 0–6)
GIANT PLATELETS BLD QL SMEAR: PRESENT — SIGNIFICANT CHANGE UP
HCT VFR BLD CALC: 38.5 % — LOW (ref 39–50)
HGB BLD-MCNC: 12.3 G/DL — LOW (ref 13–17)
IMM GRANULOCYTES NFR BLD AUTO: 0.3 % — SIGNIFICANT CHANGE UP (ref 0–1.5)
LYMPHOCYTES # BLD AUTO: 1.08 K/UL — SIGNIFICANT CHANGE UP (ref 1–3.3)
LYMPHOCYTES # BLD AUTO: 32.7 % — SIGNIFICANT CHANGE UP (ref 13–44)
LYMPHOCYTES NFR SPEC AUTO: 29.2 % — SIGNIFICANT CHANGE UP (ref 13–44)
MACROCYTES BLD QL: SIGNIFICANT CHANGE UP
MCHC RBC-ENTMCNC: 29.6 PG — SIGNIFICANT CHANGE UP (ref 27–34)
MCHC RBC-ENTMCNC: 31.9 % — LOW (ref 32–36)
MCV RBC AUTO: 92.5 FL — SIGNIFICANT CHANGE UP (ref 80–100)
METAMYELOCYTES # FLD: 0 % — SIGNIFICANT CHANGE UP (ref 0–1)
MICROCYTES BLD QL: SLIGHT — SIGNIFICANT CHANGE UP
MONOCYTES # BLD AUTO: 0.41 K/UL — SIGNIFICANT CHANGE UP (ref 0–0.9)
MONOCYTES NFR BLD AUTO: 12.4 % — SIGNIFICANT CHANGE UP (ref 2–14)
MONOCYTES NFR BLD: 8.5 % — SIGNIFICANT CHANGE UP (ref 2–9)
MYELOCYTES NFR BLD: 0 % — SIGNIFICANT CHANGE UP (ref 0–0)
NEUTROPHIL AB SER-ACNC: 53.8 % — SIGNIFICANT CHANGE UP (ref 43–77)
NEUTROPHILS # BLD AUTO: 1.73 K/UL — LOW (ref 1.8–7.4)
NEUTROPHILS NFR BLD AUTO: 52.5 % — SIGNIFICANT CHANGE UP (ref 43–77)
NEUTS BAND # BLD: 0 % — SIGNIFICANT CHANGE UP (ref 0–6)
NRBC # FLD: 0 K/UL — SIGNIFICANT CHANGE UP (ref 0–0)
OTHER - HEMATOLOGY %: 0 — SIGNIFICANT CHANGE UP
PLATELET # BLD AUTO: 185 K/UL — SIGNIFICANT CHANGE UP (ref 150–400)
PLATELET COUNT - ESTIMATE: NORMAL — SIGNIFICANT CHANGE UP
PMV BLD: 11.6 FL — SIGNIFICANT CHANGE UP (ref 7–13)
PROMYELOCYTES # FLD: 0 % — SIGNIFICANT CHANGE UP (ref 0–0)
RBC # BLD: 4.16 M/UL — LOW (ref 4.2–5.8)
RBC # FLD: 13.2 % — SIGNIFICANT CHANGE UP (ref 10.3–14.5)
REVIEW TO FOLLOW: YES — SIGNIFICANT CHANGE UP
SMUDGE CELLS # BLD: PRESENT — SIGNIFICANT CHANGE UP
VARIANT LYMPHS # BLD: 6.6 % — SIGNIFICANT CHANGE UP
WBC # BLD: 3.3 K/UL — LOW (ref 3.8–10.5)
WBC # FLD AUTO: 3.3 K/UL — LOW (ref 3.8–10.5)